# Patient Record
Sex: MALE | Race: WHITE | NOT HISPANIC OR LATINO | Employment: FULL TIME
[De-identification: names, ages, dates, MRNs, and addresses within clinical notes are randomized per-mention and may not be internally consistent; named-entity substitution may affect disease eponyms.]

---
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
## 2019-12-12 ENCOUNTER — OFFICE VISIT - HEALTHEAST (OUTPATIENT)
Dept: FAMILY MEDICINE | Facility: CLINIC | Age: 19
End: 2019-12-12

## 2019-12-12 DIAGNOSIS — J02.9 SORE THROAT: ICD-10-CM

## 2019-12-12 DIAGNOSIS — J06.9 VIRAL URI: ICD-10-CM

## 2019-12-12 LAB — DEPRECATED S PYO AG THROAT QL EIA: NORMAL

## 2019-12-13 LAB — GROUP A STREP BY PCR: NORMAL

## 2021-05-26 ENCOUNTER — RECORDS - HEALTHEAST (OUTPATIENT)
Dept: ADMINISTRATIVE | Facility: CLINIC | Age: 21
End: 2021-05-26

## 2021-06-02 ENCOUNTER — RECORDS - HEALTHEAST (OUTPATIENT)
Dept: ADMINISTRATIVE | Facility: CLINIC | Age: 21
End: 2021-06-02

## 2021-06-04 VITALS
OXYGEN SATURATION: 98 % | SYSTOLIC BLOOD PRESSURE: 136 MMHG | WEIGHT: 160 LBS | BODY MASS INDEX: 23.12 KG/M2 | DIASTOLIC BLOOD PRESSURE: 86 MMHG | HEART RATE: 80 BPM | RESPIRATION RATE: 16 BRPM | TEMPERATURE: 98 F

## 2021-06-04 NOTE — PROGRESS NOTES
Chief Complaint   Patient presents with     Illness     x 1.5-2 weeks, 3-4 days ago sore throat started, cough worse at night       HPI:  Terry Singh is a 19 y.o. male who complains of moderate sore throat & cough onset 3 days ago. Pt also had what he thought may have been influenza 2 weeks ago with several days of cough, HA, myalgias, and fever. He was then in normal state of health until 3 days ago. Symptoms are constant in duration. No treatments tried. Denies recent fever, CP, SOB, abd pain, N/V/D, rash, or any other symptoms. Patient denies sick contacts.    Problem List:  There are no relevant problems documented for this patient.      No past medical history on file.    Social History     Tobacco Use     Smoking status: Former Smoker     Smokeless tobacco: Never Used   Substance Use Topics     Alcohol use: Not on file       Review of Systems   Constitutional: Negative for chills and fever.   HENT: Positive for sore throat.    Respiratory: Positive for cough. Negative for shortness of breath.    Cardiovascular: Negative for chest pain.   Gastrointestinal: Negative for abdominal pain, diarrhea, nausea and vomiting.   Skin: Negative for rash.   All other systems reviewed and are negative.      Vitals:    12/12/19 1311   BP: 136/86   Pulse: 80   Resp: 16   Temp: 98  F (36.7  C)   TempSrc: Oral   SpO2: 98%   Weight: 160 lb (72.6 kg)       Physical Exam   Constitutional: He appears well-developed and well-nourished.  Non-toxic appearance.   HENT:   Head: Normocephalic and atraumatic.   Right Ear: Tympanic membrane, external ear and ear canal normal.   Left Ear: Tympanic membrane, external ear and ear canal normal.   Nose: Nose normal.   Mouth/Throat: Uvula is midline and mucous membranes are normal. Posterior oropharyngeal erythema present. No posterior oropharyngeal edema or tonsillar abscesses.   Cardiovascular: Normal rate, regular rhythm and normal heart sounds.   Pulmonary/Chest: Effort normal and  breath sounds normal.   Neurological: He is alert.   Skin: Skin is warm and dry.   Psychiatric: He has a normal mood and affect.       Labs:  Recent Results (from the past 72 hour(s))   Rapid Strep A Screen-Throat swab   Result Value Ref Range    Rapid Strep A Antigen No Group A Strep detected, presumptive negative No Group A Strep detected, presumptive negative       Radiology:    No results found.    Clinical Decision Making:    Strep negative, lungs CTAB. Suspect viral URI, supportive cares discussed. Pt declines Rx for cough suppressant.    At the end of the encounter, I discussed results, diagnosis, medications. Discussed red flags for immediate return to clinic/ER, as well as indications for follow up if no improvement. Patient understood and agreed to plan. Patient was stable for discharge.    1. Viral URI     2. Sore throat  Rapid Strep A Screen-Throat swab    Group A Strep, RNA Direct Detection, Throat         Return in about 2 weeks (around 12/26/2019) for Follow up w/ primary care provider if not improved.    BARRINGTON Morales, [facility]

## 2021-06-17 NOTE — PATIENT INSTRUCTIONS - HE
Patient Instructions by Jennifer Zacarias PA-C at 12/12/2019  1:10 PM     Author: Jennifer Zacarias PA-C Service: -- Author Type: Physician Assistant    Filed: 12/12/2019  1:30 PM Encounter Date: 12/12/2019 Status: Signed    : Jennifer Zacarias PA-C (Physician Assistant)       Patient Education     Viral Upper Respiratory Illness (Adult)  You have a viral upper respiratory illness (URI), which is another term for the common cold. This illness is contagious during the first few days. It is spread through the air by coughing and sneezing. It may also be spread by direct contact (touching the sick person and then touching your own eyes, nose, or mouth). Frequent handwashing will decrease risk of spread. Most viral illnesses go away within 7 to 10 days with rest and simple home remedies. Sometimes the illness may last for several weeks. Antibiotics will not kill a virus, and they are generally not prescribed for this condition.    Home care    If symptoms are severe, rest at home for the first 2 to 3 days. When you resume activity, don't let yourself get too tired.    Avoid being exposed to cigarette smoke (yours or others).    You may use acetaminophen or ibuprofen to control pain and fever, unless another medicine was prescribed. If you have chronic liver or kidney disease, have ever had a stomach ulcer or gastrointestinal bleeding, or are taking blood-thinning medicines, talk with your healthcare provider before using these medicines. Aspirin should never be given to anyone under 18 years of age who is ill with a viral infection or fever. It may cause severe liver or brain damage.    Your appetite may be poor, so a light diet is fine. Avoid dehydration by drinking 6 to 8 glasses of fluids per day (water, soft drinks, juices, tea, or soup). Extra fluids will help loosen secretions in the nose and lungs.    Over-the-counter cold medicines will not shorten the length of time youre sick, but  they may be helpful for the following symptoms: cough, sore throat, and nasal and sinus congestion. (Note: Do not use decongestants if you have high blood pressure.)  Follow-up care  Follow up with your healthcare provider, or as advised.  When to seek medical advice  Call your healthcare provider right away if any of these occur:    Cough with lots of colored sputum (mucus)    Severe headache; face, neck, or ear pain    Difficulty swallowing due to throat pain    Fever of 100.4 F (38 C) or higher, or as directed by your healthcare provider  Call 911  Call 911 if any of these occur:    Chest pain, shortness of breath, wheezing, or difficulty breathing    Coughing up blood    Inability to swallow due to throat pain  Date Last Reviewed: 9/13/2015 2000-2017 [employer]. [address] [place] [address], [place] [address]. All rights reserved. This information is not intended as a substitute for professional medical care. Always follow your healthcare professional's instructions.

## 2021-09-27 ENCOUNTER — NURSE TRIAGE (OUTPATIENT)
Dept: NURSING | Facility: CLINIC | Age: 21
End: 2021-09-27

## 2021-09-27 NOTE — TELEPHONE ENCOUNTER
Patient is complaining that he fell out of a hammock and hit the back of his head. Caller says he has had a concussion before and this feels like on. Patient is having difficulty finishing sentences. Triage guidelines recommend to go to ED now. Caller verbalized and understands directives.  COVID 19 Nurse Triage Plan/Patient Instructions    Please be aware that novel coronavirus (COVID-19) may be circulating in the community. If you develop symptoms such as fever, cough, or SOB or if you have concerns about the presence of another infection including coronavirus (COVID-19), please contact your health care provider or visit [email or web address].     Disposition/Instructions    ED Visit recommended. Follow protocol based instructions.     Bring Your Own Device:  Please also bring your smart device(s) (smart phones, tablets, laptops) and their charging cables for your personal use and to communicate with your care team during your visit.    Thank you for taking steps to prevent the spread of this virus.  o Limit your contact with others.  o Wear a simple mask to cover your cough.  o Wash your hands well and often.    Resources    [facility]: About COVID-19: [email or web address]    CDC: What to Do If You're Sick: www.cdc.gov/coronavirus/2019-ncov/about/steps-when-sick.html    CDC: Ending Home Isolation: www.cdc.gov/coronavirus/2019-ncov/hcp/disposition-in-home-patients.html     CDC: Caring for Someone: www.cdc.gov/coronavirus/2019-ncov/if-you-are-sick/care-for-someone.html     [facility]: Interim Guidance for Hospital Discharge to Home: www.health.[facility].[place]/diseases/coronavirus/hcp/hospdischarge.pdf    [facility] clinical trials (COVID-19 research studies): [email or web address]     Below are the COVID-19 hotlines at the [place] Department of [facility] ([facility]). Interpreters are available.   o For health questions: Call 919-611-3853 or 1-696.294.8408 (7 a.m. to 7  p.m.)  o For questions about schools and childcare: Call 414-893-1917 or 1-999.404.7243 (7 a.m. to 7 p.m.)                     Reason for Disposition    Vomiting once or more    Additional Information    Negative: [1] ACUTE NEURO SYMPTOM AND [2] present now  (DEFINITION: difficult to awaken OR confused thinking and talking OR slurred speech OR weakness of arms OR unsteady walking)    Negative: Knocked out (unconscious) > 1 minute    Negative: Seizure (convulsion) occurred  (Exception: prior history of seizures and now alert and without Acute Neuro Symptoms)    Negative: Penetrating head injury (e.g., knife, gun shot wound, metal object)    Negative: [1] Major bleeding (e.g., actively dripping or spurting) AND [2] can't be stopped    Negative: [1] Dangerous mechanism of injury (e.g., MVA, diving, trampoline, contact sports, fall > 10 feet or 3 meters) AND [2] NECK pain AND [3] began < 1 hour after injury    Negative: Sounds like a life-threatening emergency to the triager    Negative: [1] Diagnosed with concussion AND [2] within last 14 days    Negative: [1] Traumatic brain injury (mTBI; concussion) AND [2] more than 14 days since head injury    Negative: Can't remember what happened (amnesia)    Protocols used: HEAD INJURY-A-

## 2022-03-24 ENCOUNTER — HOSPITAL ENCOUNTER (EMERGENCY)
Facility: CLINIC | Age: 22
Discharge: HOME | End: 2022-03-25
Attending: EMERGENCY MEDICINE | Admitting: EMERGENCY MEDICINE
Payer: COMMERCIAL

## 2022-03-24 DIAGNOSIS — R79.89 ELEVATED LACTIC ACID LEVEL: ICD-10-CM

## 2022-03-24 DIAGNOSIS — F10.920 ALCOHOLIC INTOXICATION WITHOUT COMPLICATION (H): ICD-10-CM

## 2022-03-24 DIAGNOSIS — E86.0 DEHYDRATION: ICD-10-CM

## 2022-03-24 DIAGNOSIS — K92.0 HEMATEMESIS WITH NAUSEA: Primary | ICD-10-CM

## 2022-03-24 LAB
ALBUMIN SERPL-MCNC: 5.3 G/DL (ref 3.4–5)
ALP SERPL-CCNC: 88 U/L (ref 40–150)
ALT SERPL W P-5'-P-CCNC: 136 U/L (ref 0–70)
ANION GAP SERPL CALCULATED.3IONS-SCNC: 15 MMOL/L (ref 3–14)
AST SERPL W P-5'-P-CCNC: 35 U/L (ref 0–45)
ATRIAL RATE - MUSE: 124 BPM
BASOPHILS # BLD AUTO: 0 10E3/UL (ref 0–0.2)
BASOPHILS NFR BLD AUTO: 0 %
BILIRUB SERPL-MCNC: 1.1 MG/DL (ref 0.2–1.3)
BUN SERPL-MCNC: 9 MG/DL (ref 7–30)
CALCIUM SERPL-MCNC: 9.4 MG/DL (ref 8.5–10.1)
CHLORIDE BLD-SCNC: 93 MMOL/L (ref 94–109)
CO2 SERPL-SCNC: 26 MMOL/L (ref 20–32)
CREAT SERPL-MCNC: 0.72 MG/DL (ref 0.66–1.25)
DIASTOLIC BLOOD PRESSURE - MUSE: NORMAL MMHG
EOSINOPHIL # BLD AUTO: 0 10E3/UL (ref 0–0.7)
EOSINOPHIL NFR BLD AUTO: 1 %
ERYTHROCYTE [DISTWIDTH] IN BLOOD BY AUTOMATED COUNT: 11.6 % (ref 10–15)
ETHANOL SERPL-MCNC: 0.35 G/DL
GFR SERPL CREATININE-BSD FRML MDRD: >90 ML/MIN/1.73M2
GLUCOSE BLD-MCNC: 121 MG/DL (ref 70–99)
HCT VFR BLD AUTO: 46.3 % (ref 40–53)
HGB BLD-MCNC: 16 G/DL (ref 13.3–17.7)
HOLD SPECIMEN: NORMAL
IMM GRANULOCYTES # BLD: 0 10E3/UL
IMM GRANULOCYTES NFR BLD: 0 %
INTERPRETATION ECG - MUSE: NORMAL
LACTATE SERPL-SCNC: 4.1 MMOL/L (ref 0.7–2)
LACTATE SERPL-SCNC: 4.9 MMOL/L (ref 0.7–2)
LYMPHOCYTES # BLD AUTO: 3.1 10E3/UL (ref 0.8–5.3)
LYMPHOCYTES NFR BLD AUTO: 36 %
MCH RBC QN AUTO: 30.4 PG (ref 26.5–33)
MCHC RBC AUTO-ENTMCNC: 34.6 G/DL (ref 31.5–36.5)
MCV RBC AUTO: 88 FL (ref 78–100)
MONOCYTES # BLD AUTO: 0.7 10E3/UL (ref 0–1.3)
MONOCYTES NFR BLD AUTO: 8 %
NEUTROPHILS # BLD AUTO: 4.7 10E3/UL (ref 1.6–8.3)
NEUTROPHILS NFR BLD AUTO: 55 %
NRBC # BLD AUTO: 0 10E3/UL
NRBC BLD AUTO-RTO: 0 /100
P AXIS - MUSE: 52 DEGREES
PLATELET # BLD AUTO: 562 10E3/UL (ref 150–450)
POTASSIUM BLD-SCNC: 3.6 MMOL/L (ref 3.4–5.3)
PR INTERVAL - MUSE: 142 MS
PROT SERPL-MCNC: 8.8 G/DL (ref 6.8–8.8)
QRS DURATION - MUSE: 88 MS
QT - MUSE: 316 MS
QTC - MUSE: 453 MS
R AXIS - MUSE: 68 DEGREES
RBC # BLD AUTO: 5.26 10E6/UL (ref 4.4–5.9)
SODIUM SERPL-SCNC: 134 MMOL/L (ref 133–144)
SYSTOLIC BLOOD PRESSURE - MUSE: NORMAL MMHG
T AXIS - MUSE: 60 DEGREES
VENTRICULAR RATE- MUSE: 124 BPM
WBC # BLD AUTO: 8.5 10E3/UL (ref 4–11)

## 2022-03-24 PROCEDURE — 96375 TX/PRO/DX INJ NEW DRUG ADDON: CPT

## 2022-03-24 PROCEDURE — 36415 COLL VENOUS BLD VENIPUNCTURE: CPT | Performed by: EMERGENCY MEDICINE

## 2022-03-24 PROCEDURE — 85025 COMPLETE CBC W/AUTO DIFF WBC: CPT | Performed by: EMERGENCY MEDICINE

## 2022-03-24 PROCEDURE — 250N000011 HC RX IP 250 OP 636: Performed by: EMERGENCY MEDICINE

## 2022-03-24 PROCEDURE — 82077 ASSAY SPEC XCP UR&BREATH IA: CPT | Performed by: EMERGENCY MEDICINE

## 2022-03-24 PROCEDURE — 250N000009 HC RX 250: Performed by: EMERGENCY MEDICINE

## 2022-03-24 PROCEDURE — 96361 HYDRATE IV INFUSION ADD-ON: CPT

## 2022-03-24 PROCEDURE — 99284 EMERGENCY DEPT VISIT MOD MDM: CPT | Mod: 25

## 2022-03-24 PROCEDURE — 258N000003 HC RX IP 258 OP 636: Performed by: EMERGENCY MEDICINE

## 2022-03-24 PROCEDURE — 93005 ELECTROCARDIOGRAM TRACING: CPT

## 2022-03-24 PROCEDURE — 80053 COMPREHEN METABOLIC PANEL: CPT | Performed by: EMERGENCY MEDICINE

## 2022-03-24 PROCEDURE — 83605 ASSAY OF LACTIC ACID: CPT | Performed by: EMERGENCY MEDICINE

## 2022-03-24 PROCEDURE — 96365 THER/PROPH/DIAG IV INF INIT: CPT

## 2022-03-24 RX ORDER — ONDANSETRON 2 MG/ML
4 INJECTION INTRAMUSCULAR; INTRAVENOUS EVERY 30 MIN PRN
Status: DISCONTINUED | OUTPATIENT
Start: 2022-03-24 | End: 2022-03-25 | Stop reason: HOSPADM

## 2022-03-24 RX ORDER — SODIUM CHLORIDE 9 MG/ML
INJECTION, SOLUTION INTRAVENOUS CONTINUOUS
Status: DISCONTINUED | OUTPATIENT
Start: 2022-03-24 | End: 2022-03-25 | Stop reason: HOSPADM

## 2022-03-24 RX ADMIN — FOLIC ACID: 5 INJECTION, SOLUTION INTRAMUSCULAR; INTRAVENOUS; SUBCUTANEOUS at 22:36

## 2022-03-24 RX ADMIN — SODIUM CHLORIDE 1000 ML: 9 INJECTION, SOLUTION INTRAVENOUS at 21:05

## 2022-03-24 RX ADMIN — ONDANSETRON 4 MG: 2 INJECTION INTRAMUSCULAR; INTRAVENOUS at 21:05

## 2022-03-24 RX ADMIN — SODIUM CHLORIDE 1000 ML: 9 INJECTION, SOLUTION INTRAVENOUS at 21:56

## 2022-03-24 RX ADMIN — SODIUM CHLORIDE: 9 INJECTION, SOLUTION INTRAVENOUS at 23:53

## 2022-03-24 ASSESSMENT — ENCOUNTER SYMPTOMS
SHORTNESS OF BREATH: 0
NAUSEA: 0
VOMITING: 1
BLOOD IN STOOL: 0

## 2022-03-25 VITALS
OXYGEN SATURATION: 96 % | SYSTOLIC BLOOD PRESSURE: 132 MMHG | BODY MASS INDEX: 23.38 KG/M2 | WEIGHT: 167 LBS | HEART RATE: 85 BPM | DIASTOLIC BLOOD PRESSURE: 94 MMHG | HEIGHT: 71 IN | RESPIRATION RATE: 16 BRPM | TEMPERATURE: 97.7 F

## 2022-03-25 LAB — LACTATE SERPL-SCNC: 3.5 MMOL/L (ref 0.7–2)

## 2022-03-25 PROCEDURE — 36415 COLL VENOUS BLD VENIPUNCTURE: CPT | Performed by: EMERGENCY MEDICINE

## 2022-03-25 PROCEDURE — 83605 ASSAY OF LACTIC ACID: CPT | Performed by: EMERGENCY MEDICINE

## 2022-03-25 NOTE — ED NOTES
Patient asking if he can leave multiple times, aware of NICKI and plan for discharge at 0400. Patient still cooperative. Maintenance fluids running, repeat lactic sent.

## 2022-03-25 NOTE — ED PROVIDER NOTES
"  History   Chief Complaint:  Hematemesis       HPI   Terry Singh is a 22 year old male presents with hematemesis. Patient last drank beer and whiskey today at 730 pm. Patient has been drinking every day for the past 5-10 days, which is more than usual for him. He has been drinking heavily on and off in the past 2 years he tells me however. Patient has had tremors and anxiety the last few times it has been sober for a few days, but denies any history of seizures. Patient started to vomited dark red and brown colored vomit 2x today, and this was the first time he ever noticed possible blood in his vomit. Patient denies current nausea, shortness of breath, chest pain, blood in stool, melena. No history of liver of pancreas disease.     Review of Systems   Respiratory: Negative for shortness of breath.    Cardiovascular: Negative for chest pain.   Gastrointestinal: Positive for vomiting (blood). Negative for blood in stool and nausea.   All other systems reviewed and are negative.    Allergies:  Seasonal Allergies    Medications:  loratadine     Past Medical History:     No past medical history on file.    Past Surgical History:    Circumcision     Family History:    The patient denies past family history.     Social History:  Presents alone.    Physical Exam     Patient Vitals for the past 24 hrs:   BP Temp Pulse Resp SpO2 Height Weight   03/24/22 2350 (!) 157/95 -- 92 13 95 % -- --   03/24/22 2230 (!) 142/93 -- 95 17 96 % -- --   03/24/22 2135 (!) 128/95 -- 103 14 96 % -- --   03/24/22 2032 (!) 159/109 98.7  F (37.1  C) (!) 130 12 96 % 1.803 m (5' 11\") 75.8 kg (167 lb)       Physical Exam   Vitals: reviewed by me  General: Pt seen on [facility], pleasant, cooperative, and alert to conversation.  Intoxicated appearing however  Eyes: Tracking well, clear conjunctiva BL  ENT: MMM, midline trachea.   Lungs: No tachypnea, no accessory muscle use. No respiratory distress.  Clear to auscultation bilaterally  CV: " Rate as above, normal S1-S2  Abd: Soft, non tender, no guarding, no rebound. Non distended  MSK: no joint effusion.  No evidence of trauma  Skin: No rash  Neuro: Slurred speech and no facial droop.  Moving all extremity spontaneously, following all commands  Psych: Not RIS, no e/o AH/VH.  Denies suicidal ideation      Emergency Department Course   ECG  ECG obtained at 2111, ECG read at 2112  Sinus tachycardia.  Rate 124 bpm. NE interval 142 ms. QRS duration 88 ms. QT/QTc 316/453 ms. P-R-T axes 52 68 60.     Laboratory:  Labs Ordered and Resulted from Time of ED Arrival to Time of ED Departure   COMPREHENSIVE METABOLIC PANEL - Abnormal       Result Value    Sodium 134      Potassium 3.6      Chloride 93 (*)     Carbon Dioxide (CO2) 26      Anion Gap 15 (*)     Urea Nitrogen 9      Creatinine 0.72      Calcium 9.4      Glucose 121 (*)     Alkaline Phosphatase 88      AST 35       (*)     Protein Total 8.8      Albumin 5.3 (*)     Bilirubin Total 1.1      GFR Estimate >90     LACTIC ACID WHOLE BLOOD - Abnormal    Lactic Acid 4.9 (*)    ETHYL ALCOHOL LEVEL - Abnormal    Alcohol ethyl 0.35 (*)    CBC WITH PLATELETS AND DIFFERENTIAL - Abnormal    WBC Count 8.5      RBC Count 5.26      Hemoglobin 16.0      Hematocrit 46.3      MCV 88      MCH 30.4      MCHC 34.6      RDW 11.6      Platelet Count 562 (*)     % Neutrophils 55      % Lymphocytes 36      % Monocytes 8      % Eosinophils 1      % Basophils 0      % Immature Granulocytes 0      NRBCs per 100 WBC 0      Absolute Neutrophils 4.7      Absolute Lymphocytes 3.1      Absolute Monocytes 0.7      Absolute Eosinophils 0.0      Absolute Basophils 0.0      Absolute Immature Granulocytes 0.0      Absolute NRBCs 0.0     LACTIC ACID WHOLE BLOOD - Abnormal    Lactic Acid 4.1 (*)         Emergency Department Course:     Reviewed:  I reviewed nursing notes, vitals, past medical history and Care Everywhere    Assessments:  2041 I obtained history and examined the patient  as noted above.   2330 I rechecked the patient and explained findings.   0000 I rechecked the patient.     Interventions:  2105  Bolus 1L IV  2105 Zofran 4mg IV  2156 Bolus 1L IV  2236 Banana bag IV    Disposition:  Care of the patient was transferred to my colleague Dr. Waller pending clinical sobriety.     Impression & Plan     Medical Decision Making:  This is a pleasant but very intoxicated 22-year-old male presents the emergency room with dark vomit and significant intoxication.  He has not vomited here, and his hemoglobin is very reassuring.  He has no history of esophageal varices, and has never had to get an EGD before.   He not had any melena, and he may have a Lata-Schneider tear, though we cannot be sure without endoscopy.  His lactate is elevated from dehydration, and he is initially quite intoxicated here.  However he tells me adamantly he does not want to be admitted for an endoscopy or for any medical therapy of any kind.  He wants to go home, and tells me he has work in the morning and does not want his symptoms worked up after all.     I did put him on a health officer hold because he is quite intoxicated however, and I told him when he is sober he can leave.  He tells me strongly prefers this option to being admitted, and we agreed to reassess around 4 AM.  He is still intoxicated appearing, and I do not think that he has capacity to leave right now and take care of himself, though this is likely to change as he becomes more sober.  He tells me that he does not want to have any additional work-up done here, and would like to be discharged as soon as he can be.  Also if he does get a sober chaperone to take him home and monitor him, I do think he would be allowed to leave at that time.  Signed out to Dr. Waller pending the above plan.    Lactate is elevated but coming down, likely due to dehydration.  No signs of trauma, he has no other medical complaints at this time and has not vomited since  arrival.    Diagnosis:    ICD-10-CM    1. Elevated lactic acid level  R79.89    2. Hematemesis with nausea  K92.0    3. Alcoholic intoxication without complication (H)  F10.920    4. Dehydration  E86.0        Scribe Disclosure:  I, Rubio Crowley, am serving as a scribe at 8:41 PM on 3/24/2022 to document services personally performed by Xavier Quintana MD based on my observations and the provider's statements to me.          Xavier Quintana MD  03/25/22 0053       Xavier Quintana MD  03/25/22 0103

## 2022-03-25 NOTE — ED NOTES
Agree with triage note, to add:  Patient states he thinks he vomited about 1730 tonight. Last drink around 1830. Has not vomited blood before. Has been drinking daily for the past 10 days, regularly drinks 5-6 days/week. Has had tremors from withdrawal, but no seizures.

## 2022-03-25 NOTE — ED PROVIDER NOTES
Patient care signed out to me by Dr. Quintana.  Please see his initial ED provider note regarding HPI, ROS, physical exam, and medical decision making.  In brief patient is a 22-year-old male who presents with alcohol abuse, hematemesis, and anxiety.  Patient had a lactate elevation which was thought to be secondary to alcohol abuse and/or dehydration.  This is continued to clear appropriately with fluid resuscitation.  Low concern for sepsis.    Patient was intoxicated on arrival although has sobered clinically while under my care.  He was able to tolerate oral intake and walk with steady gait.  I think at this time he is safe for discharge home with plans for close outpatient follow-up.  He had been offered admission for potential endoscopy but had declined this under the care of Dr. Quintana.  Ultimately as patient is feeling improved he would like to go home at this time with close outpatient follow-up.  We discussed use of Prilosec for potential early ulcerative disease, gastritis, or esophagitis.  We also discussed close follow-up with gastroenterology or sooner return to the emergency department should symptoms worsen.  Encouraged to return to the emergency department should he develop worsening hematemesis, fever, or any new concerning symptom onset.  After all questions answered, discharged home.      ICD-10-CM    1. Hematemesis with nausea  K92.0    2. Elevated lactic acid level  R79.89    3. Alcoholic intoxication without complication (H)  F10.920    4. Dehydration  E86.0           Natanael Waller MD  03/25/22 0346

## 2022-03-25 NOTE — ED TRIAGE NOTES
Patient reports vomiting blood about 1 hr ago with mild abdominal discomfort. Reports drinking a lot today, last drink 2 hrs ago hard liquor and beers.

## 2022-03-25 NOTE — DISCHARGE INSTRUCTIONS
Please come back to the ER immediately with any additional bleeding in your vomit, or with any dark stools.  Please ask your regular doctor for an upper endoscopy to evaluate the source of your bleeding, and please do consider drinking significantly less alcohol.  Come back with any tremors, or any other concerns you have

## 2022-03-25 NOTE — ED NOTES
Discharge instructions reviewed with patient, who verbalized understanding. Patient departed the ED in no apparent distress.